# Patient Record
Sex: MALE | Race: ASIAN | Employment: FULL TIME | URBAN - METROPOLITAN AREA
[De-identification: names, ages, dates, MRNs, and addresses within clinical notes are randomized per-mention and may not be internally consistent; named-entity substitution may affect disease eponyms.]

---

## 2017-09-05 PROBLEM — M77.8 TRICEPS TENDINITIS: Status: ACTIVE | Noted: 2017-09-05

## 2018-05-08 PROBLEM — G89.29 CHRONIC PAIN OF LEFT ANKLE: Status: ACTIVE | Noted: 2018-05-08

## 2018-05-08 PROBLEM — M25.572 CHRONIC PAIN OF LEFT ANKLE: Status: ACTIVE | Noted: 2018-05-08

## 2024-06-14 ENCOUNTER — OFFICE VISIT (OUTPATIENT)
Dept: FAMILY MEDICINE CLINIC | Facility: CLINIC | Age: 34
End: 2024-06-14
Payer: COMMERCIAL

## 2024-06-14 VITALS
BODY MASS INDEX: 24.25 KG/M2 | TEMPERATURE: 98 F | HEIGHT: 68 IN | HEART RATE: 62 BPM | DIASTOLIC BLOOD PRESSURE: 78 MMHG | RESPIRATION RATE: 18 BRPM | OXYGEN SATURATION: 97 % | SYSTOLIC BLOOD PRESSURE: 106 MMHG | WEIGHT: 160 LBS

## 2024-06-14 DIAGNOSIS — J06.9 VIRAL URI WITH COUGH: Primary | ICD-10-CM

## 2024-06-14 DIAGNOSIS — R06.2 WHEEZE: ICD-10-CM

## 2024-06-14 PROCEDURE — 99202 OFFICE O/P NEW SF 15 MIN: CPT | Performed by: NURSE PRACTITIONER

## 2024-06-14 RX ORDER — BENZONATATE 200 MG/1
200 CAPSULE ORAL 3 TIMES DAILY PRN
Qty: 20 CAPSULE | Refills: 0 | Status: SHIPPED | OUTPATIENT
Start: 2024-06-14

## 2024-06-14 RX ORDER — ALBUTEROL SULFATE 90 UG/1
2 AEROSOL, METERED RESPIRATORY (INHALATION) EVERY 6 HOURS PRN
Qty: 1 EACH | Refills: 0 | Status: SHIPPED | OUTPATIENT
Start: 2024-06-14

## 2024-06-14 NOTE — PROGRESS NOTES
CHIEF COMPLAINT:     Chief Complaint   Patient presents with    Cough     COUGH [id number]       HPI:   Misha Dunn is a 34 year old male who presents for upper respiratory symptoms for  2 hours.  His wife just delivered a baby last night so he has been staying at the hospital.  Patient reports  cough .  Associated symptoms include coughing and wheeze.  Denies fever, chills, GI symptoms.    Symptoms have been consistent since onset.  Treating symptoms with he just took his zyrtec 1 hour ago.   From all the pregnancy stuff, he has missed that last few days.      No COVID exposure    Current Outpatient Medications   Medication Sig Dispense Refill    Cetirizine HCl (ZYRTEC ALLERGY OR) Take by mouth daily.          History reviewed. No pertinent past medical history.   Past Surgical History:   Procedure Laterality Date    Repair achilles tendon,primary Left 2-10-16    Dr. Blankenship         Social History     Socioeconomic History    Marital status:    Tobacco Use    Smoking status: Never    Smokeless tobacco: Never   Substance and Sexual Activity    Alcohol use: No     Alcohol/week: 0.0 standard drinks of alcohol    Drug use: No         REVIEW OF SYSTEMS:   GENERAL: feels well otherwise,   good appetite  SKIN: no rashes or abnormal skin lesions  HEENT: See HPI  LUNGS: See HPI  CARDIOVASCULAR: denies chest pain or palpitations   GI: denies N/V/C or abdominal pain  NEURO: Denies headaches    EXAM:   /78   Pulse 62   Temp 97.7 °F (36.5 °C) (Oral)   Resp 18   Ht 5' 8\" (1.727 m)   Wt 160 lb (72.6 kg)   SpO2 97%   BMI 24.33 kg/m²   GENERAL: well developed, well nourished, in no apparent distress  SKIN: no rashes,no suspicious lesions  HEAD: atraumatic, normocephalic.  No tenderness on palpation of sinuses  EYES: conjunctiva clear  EARS: TM's clear, no bulging, no retraction, no fluid, bony landmarks visualized.   NOSE: Nostrils patent, clear nasal discharge, nasal mucosa pink and minimally swollen  THROAT:  Oral mucosa pink, moist. Posterior pharynx is not erythematous. + post nasal drip.  Tonsils 1/4.    NECK: Supple, non-tender  LUNGS: clear to auscultation bilaterally; good air movement.  Breathing is non labored.  Wheezy cough.   CARDIO: RRR without murmur  EXTREMITIES: no cyanosis, clubbing or edema  LYMPH:  No cervical lymphadenopathy.        ASSESSMENT AND PLAN:   Misha Dunn is a 34 year old male who presents with     ASSESSMENT:   Encounter Diagnoses   Name Primary?    Viral URI with cough Yes    Wheeze        PLAN:   Zyrtec daily  He reports a childhood history of asthma  He declined Covid/FLU/RSV testing  Meds as below.  Risks, benefits, and side effects of medication explained and discussed.  Discussed likely viral/allergy etiology. Reviewed symptom relief measures with patient.   To f/u with PCP if sx do not resolve as anticipated.      Meds & Refills for this Visit:  Requested Prescriptions     Signed Prescriptions Disp Refills    benzonatate 200 MG Oral Cap 20 capsule 0     Sig: Take 1 capsule (200 mg total) by mouth 3 (three) times daily as needed for cough. Take with a full glass of water and DO NOT CHEW.    albuterol 108 (90 Base) MCG/ACT Inhalation Aero Soln 1 each 0     Sig: Inhale 2 puffs into the lungs every 6 (six) hours as needed.           Patient Instructions   Continue Zyrtec daily  Albuterol as prescribed for wheeze, chest tightness or wheezy cough  Benzonatate for dry cough as prescribed  If symptoms change or get worse, you can always do a home Covid test.    Wear mask around  until cough has resolved.        Follow-up if not improving      The patient indicates understanding of these issues and agrees to the plan.

## 2024-06-14 NOTE — PATIENT INSTRUCTIONS
Continue Zyrtec daily  Albuterol as prescribed for wheeze, chest tightness or wheezy cough  Benzonatate for dry cough as prescribed  If symptoms change or get worse, you can always do a home Covid test.    Wear mask around  until cough has resolved.        Follow-up if not improving